# Patient Record
Sex: FEMALE | Race: WHITE
[De-identification: names, ages, dates, MRNs, and addresses within clinical notes are randomized per-mention and may not be internally consistent; named-entity substitution may affect disease eponyms.]

---

## 2019-11-14 ENCOUNTER — HOSPITAL ENCOUNTER (OUTPATIENT)
Dept: HOSPITAL 59 - HOP | Age: 59
Discharge: HOME | End: 2019-11-14
Attending: INTERNAL MEDICINE
Payer: COMMERCIAL

## 2019-11-14 DIAGNOSIS — Z12.11: Primary | ICD-10-CM

## 2019-11-14 DIAGNOSIS — E78.00: ICD-10-CM

## 2019-11-14 DIAGNOSIS — D12.3: ICD-10-CM

## 2019-11-14 DIAGNOSIS — E11.9: ICD-10-CM

## 2019-11-14 DIAGNOSIS — K56.609: ICD-10-CM

## 2019-11-14 DIAGNOSIS — K57.30: ICD-10-CM

## 2019-11-14 DIAGNOSIS — I10: ICD-10-CM

## 2019-11-15 NOTE — OPERATIVE NOTE
OPERATION: COLONOSCOPY with cold forceps polypectomy/biopsy. 



PREOPERATIVE DIAGNOSIS: Colon cancer screening, average risk. 



POSTOPERATIVE DIAGNOSES:

1. Sigmoid diverticulosis with marked angulation. 

2. Transverse colon polyp. 



PROCEDURE: After informed consent was obtained from the patient, she was placed 
in the left lateral decubitus position in the endoscopy suite, sedated and 
monitored by the department of anesthesia. Digital rectal exam was unremarkable.
A well-lubricated [id number] colonoscope was inserted into the rectum and advanced 
through an angulated, somewhat restricted sigmoid colon to the descending colon 
and transverse colon. Given the looping, I was unable to advance the pediatric 
scope farther. This was despite abdominal pressure as well. The pediatric 
colonoscope was removed and a  colonoscope was inserted into the rectum and
advanced to the cecum. The cecum and cecal bulb were unremarkable. The ascending
colon was unremarkable. There was a questionable transverse colon polyp removed 
with a cold forceps. It was quite diminutive. The remainder of the transverse 
colon, descending colon, sigmoid colon, and rectum were unremarkable other than 
moderate left-sided diverticular disease. Forward and J-turn views of the rectum
and anorectum were unrevealing. The endoscope was straightened, the rectal 
ampulla deflated, and the endoscope was removed. 



RECOMMENDATIONS: The patient should follow a high-fiber diet. She will require 
repeat exam in 5-10 years pending tissue histology. 



As always, thank you for allowing me to participate in the healthcare of your 
patients. 

ELDON